# Patient Record
Sex: FEMALE | Race: WHITE | NOT HISPANIC OR LATINO
[De-identification: names, ages, dates, MRNs, and addresses within clinical notes are randomized per-mention and may not be internally consistent; named-entity substitution may affect disease eponyms.]

---

## 2018-08-27 ENCOUNTER — APPOINTMENT (OUTPATIENT)
Dept: SURGERY | Facility: CLINIC | Age: 63
End: 2018-08-27
Payer: COMMERCIAL

## 2018-08-27 VITALS
BODY MASS INDEX: 21.16 KG/M2 | SYSTOLIC BLOOD PRESSURE: 158 MMHG | WEIGHT: 115 LBS | HEIGHT: 62 IN | TEMPERATURE: 98.1 F | DIASTOLIC BLOOD PRESSURE: 82 MMHG | HEART RATE: 55 BPM

## 2018-08-27 DIAGNOSIS — Z86.018 PERSONAL HISTORY OF OTHER BENIGN NEOPLASM: ICD-10-CM

## 2018-08-27 DIAGNOSIS — Z87.828 PERSONAL HISTORY OF OTHER (HEALED) PHYSICAL INJURY AND TRAUMA: ICD-10-CM

## 2018-08-27 DIAGNOSIS — Z71.9 COUNSELING, UNSPECIFIED: ICD-10-CM

## 2018-08-27 PROBLEM — Z00.00 ENCOUNTER FOR PREVENTIVE HEALTH EXAMINATION: Status: ACTIVE | Noted: 2018-08-27

## 2018-08-27 PROCEDURE — 99243 OFF/OP CNSLTJ NEW/EST LOW 30: CPT

## 2018-10-08 ENCOUNTER — OUTPATIENT (OUTPATIENT)
Dept: OUTPATIENT SERVICES | Facility: HOSPITAL | Age: 63
LOS: 1 days | End: 2018-10-08

## 2018-10-08 VITALS
TEMPERATURE: 98 F | SYSTOLIC BLOOD PRESSURE: 130 MMHG | DIASTOLIC BLOOD PRESSURE: 82 MMHG | WEIGHT: 115.08 LBS | HEART RATE: 56 BPM | HEIGHT: 60 IN | RESPIRATION RATE: 16 BRPM

## 2018-10-08 DIAGNOSIS — S83.8X9A SPRAIN OF OTHER SPECIFIED PARTS OF UNSPECIFIED KNEE, INITIAL ENCOUNTER: Chronic | ICD-10-CM

## 2018-10-08 DIAGNOSIS — K42.9 UMBILICAL HERNIA WITHOUT OBSTRUCTION OR GANGRENE: ICD-10-CM

## 2018-10-08 DIAGNOSIS — D25.9 LEIOMYOMA OF UTERUS, UNSPECIFIED: Chronic | ICD-10-CM

## 2018-10-08 LAB
BUN SERPL-MCNC: 14 MG/DL — SIGNIFICANT CHANGE UP (ref 7–23)
CALCIUM SERPL-MCNC: 9.1 MG/DL — SIGNIFICANT CHANGE UP (ref 8.4–10.5)
CHLORIDE SERPL-SCNC: 102 MMOL/L — SIGNIFICANT CHANGE UP (ref 98–107)
CO2 SERPL-SCNC: 25 MMOL/L — SIGNIFICANT CHANGE UP (ref 22–31)
CREAT SERPL-MCNC: 0.7 MG/DL — SIGNIFICANT CHANGE UP (ref 0.5–1.3)
GLUCOSE SERPL-MCNC: 68 MG/DL — LOW (ref 70–99)
HCT VFR BLD CALC: 44.1 % — SIGNIFICANT CHANGE UP (ref 34.5–45)
HGB BLD-MCNC: 14.5 G/DL — SIGNIFICANT CHANGE UP (ref 11.5–15.5)
MCHC RBC-ENTMCNC: 29.4 PG — SIGNIFICANT CHANGE UP (ref 27–34)
MCHC RBC-ENTMCNC: 32.9 % — SIGNIFICANT CHANGE UP (ref 32–36)
MCV RBC AUTO: 89.3 FL — SIGNIFICANT CHANGE UP (ref 80–100)
NRBC # FLD: 0 — SIGNIFICANT CHANGE UP
PLATELET # BLD AUTO: 161 K/UL — SIGNIFICANT CHANGE UP (ref 150–400)
PMV BLD: 11 FL — SIGNIFICANT CHANGE UP (ref 7–13)
POTASSIUM SERPL-MCNC: 4 MMOL/L — SIGNIFICANT CHANGE UP (ref 3.5–5.3)
POTASSIUM SERPL-SCNC: 4 MMOL/L — SIGNIFICANT CHANGE UP (ref 3.5–5.3)
RBC # BLD: 4.94 M/UL — SIGNIFICANT CHANGE UP (ref 3.8–5.2)
RBC # FLD: 12.6 % — SIGNIFICANT CHANGE UP (ref 10.3–14.5)
SODIUM SERPL-SCNC: 139 MMOL/L — SIGNIFICANT CHANGE UP (ref 135–145)
WBC # BLD: 5.03 K/UL — SIGNIFICANT CHANGE UP (ref 3.8–10.5)
WBC # FLD AUTO: 5.03 K/UL — SIGNIFICANT CHANGE UP (ref 3.8–10.5)

## 2018-10-08 NOTE — H&P PST ADULT - HISTORY OF PRESENT ILLNESS
64 y/o female presents to PST for scheduled umbilical hernia repair on 10/19/18. Patient reports 6 months ago notice umbical budge, seen by PMD. Denies tenderness, or n/v.

## 2018-10-08 NOTE — H&P PST ADULT - NSANTHOSAYNRD_GEN_A_CORE
No. PIERO screening performed.  STOP BANG Legend: 0-2 = LOW Risk; 3-4 = INTERMEDIATE Risk; 5-8 = HIGH Risk

## 2018-10-08 NOTE — H&P PST ADULT - MUSCULOSKELETAL
details… detailed exam no joint swelling/no joint erythema/ROM intact/no joint warmth/normal strength/no calf tenderness

## 2018-10-08 NOTE — H&P PST ADULT - PROBLEM SELECTOR PLAN 1
Patient scheduled umbilical hernia repair on 10/19/18.  Pre op and Hibiclens instructions given and explained.  Avoid NSAIDs and OTC supplements.   Patient verbalized understanding

## 2018-10-09 ENCOUNTER — TRANSCRIPTION ENCOUNTER (OUTPATIENT)
Age: 63
End: 2018-10-09

## 2018-10-12 ENCOUNTER — TRANSCRIPTION ENCOUNTER (OUTPATIENT)
Age: 63
End: 2018-10-12

## 2018-10-18 ENCOUNTER — TRANSCRIPTION ENCOUNTER (OUTPATIENT)
Age: 63
End: 2018-10-18

## 2018-10-19 ENCOUNTER — OUTPATIENT (OUTPATIENT)
Dept: OUTPATIENT SERVICES | Facility: HOSPITAL | Age: 63
LOS: 1 days | Discharge: HOME | End: 2018-10-19
Payer: COMMERCIAL

## 2018-10-19 ENCOUNTER — RESULT REVIEW (OUTPATIENT)
Age: 63
End: 2018-10-19

## 2018-10-19 ENCOUNTER — APPOINTMENT (OUTPATIENT)
Dept: SURGERY | Facility: AMBULATORY SURGERY CENTER | Age: 63
End: 2018-10-19

## 2018-10-19 VITALS
HEART RATE: 52 BPM | HEIGHT: 60 IN | OXYGEN SATURATION: 98 % | WEIGHT: 115.08 LBS | SYSTOLIC BLOOD PRESSURE: 103 MMHG | DIASTOLIC BLOOD PRESSURE: 61 MMHG | RESPIRATION RATE: 18 BRPM | TEMPERATURE: 97 F

## 2018-10-19 VITALS
RESPIRATION RATE: 14 BRPM | OXYGEN SATURATION: 100 % | SYSTOLIC BLOOD PRESSURE: 135 MMHG | DIASTOLIC BLOOD PRESSURE: 82 MMHG | TEMPERATURE: 98 F | HEART RATE: 51 BPM

## 2018-10-19 DIAGNOSIS — D25.9 LEIOMYOMA OF UTERUS, UNSPECIFIED: Chronic | ICD-10-CM

## 2018-10-19 DIAGNOSIS — K42.9 UMBILICAL HERNIA WITHOUT OBSTRUCTION OR GANGRENE: ICD-10-CM

## 2018-10-19 DIAGNOSIS — S83.8X9A SPRAIN OF OTHER SPECIFIED PARTS OF UNSPECIFIED KNEE, INITIAL ENCOUNTER: Chronic | ICD-10-CM

## 2018-10-19 PROCEDURE — 88302 TISSUE EXAM BY PATHOLOGIST: CPT | Mod: 26

## 2018-10-19 PROCEDURE — 49587: CPT | Mod: GC

## 2018-10-19 NOTE — BRIEF OPERATIVE NOTE - PROCEDURE
<<-----Click on this checkbox to enter Procedure Umbilical hernia repair  10/19/2018    Active  ANNIE

## 2018-10-19 NOTE — ASU DISCHARGE PLAN (ADULT/PEDIATRIC). - FOLLOWUP APPOINTMENT CLINIC/PHYSICIAN
Please follow up with Dr. Kathleen in 2 weeks. Call the office to make an appointment. Please follow up with Dr. Kathleen in 2 weeks. Call the office TODAY to make an appointment.

## 2018-10-19 NOTE — ASU DISCHARGE PLAN (ADULT/PEDIATRIC). - MEDICATION SUMMARY - MEDICATIONS TO TAKE
I will START or STAY ON the medications listed below when I get home from the hospital:    PreserVision oral tablet  -- 2 tab(s) by mouth once a day  -- Indication: For Home med    Calcium 600+D 600 mg-200 intl units oral tablet  -- 2 tab(s) by mouth once a day  -- Indication: For Home med

## 2018-10-19 NOTE — ASU DISCHARGE PLAN (ADULT/PEDIATRIC). - NOTIFY
Swelling that continues/Bleeding that does not stop Persistent Nausea and Vomiting/Fever greater than 101/Swelling that continues/Bleeding that does not stop/Pain not relieved by Medications

## 2018-10-19 NOTE — ASU DISCHARGE PLAN (ADULT/PEDIATRIC). - SPECIAL INSTRUCTIONS
Please alternate motrin and tylenol for pain control for first few days.  Place covered ice/ice pack on area every 4 hours to limit swelling. Do not leave on for more than 20 minutes.

## 2018-10-24 LAB — SURGICAL PATHOLOGY STUDY: SIGNIFICANT CHANGE UP

## 2018-10-31 ENCOUNTER — APPOINTMENT (OUTPATIENT)
Dept: SURGERY | Facility: CLINIC | Age: 63
End: 2018-10-31
Payer: COMMERCIAL

## 2018-10-31 VITALS
HEART RATE: 55 BPM | SYSTOLIC BLOOD PRESSURE: 137 MMHG | BODY MASS INDEX: 21.16 KG/M2 | HEIGHT: 62 IN | OXYGEN SATURATION: 99 % | TEMPERATURE: 98.3 F | WEIGHT: 115 LBS | DIASTOLIC BLOOD PRESSURE: 79 MMHG

## 2018-10-31 PROCEDURE — 99024 POSTOP FOLLOW-UP VISIT: CPT

## 2019-03-15 ENCOUNTER — TRANSCRIPTION ENCOUNTER (OUTPATIENT)
Age: 64
End: 2019-03-15

## 2019-05-28 NOTE — ASU PREOPERATIVE ASSESSMENT, ADULT ([facility] ONLY) - TEACHING/LEARNING LEARNING PREFERENCES
Last seen: 09/27/2018  Last filled: 12/05/2018 Fenofibrate  Upcoming apt: none      
Routed to PCP.    
written material

## 2021-07-09 ENCOUNTER — APPOINTMENT (OUTPATIENT)
Dept: CARDIOLOGY | Facility: CLINIC | Age: 66
End: 2021-07-09

## 2022-09-16 NOTE — H&P PST ADULT - PRIMARY CARE PROVIDER
Dr. Gonsales [phone]
  - pLTCS  for arrest of descent  - chemical pregnancy x3    +cervical polyp  +endometrial polyp, S/P polypectomy  hydrosalpinx    IUI

## 2022-11-04 ENCOUNTER — OUTPATIENT (OUTPATIENT)
Dept: OUTPATIENT SERVICES | Facility: HOSPITAL | Age: 67
LOS: 1 days | End: 2022-11-04
Payer: MEDICARE

## 2022-11-04 DIAGNOSIS — S83.8X9A SPRAIN OF OTHER SPECIFIED PARTS OF UNSPECIFIED KNEE, INITIAL ENCOUNTER: Chronic | ICD-10-CM

## 2022-11-04 DIAGNOSIS — D25.9 LEIOMYOMA OF UTERUS, UNSPECIFIED: Chronic | ICD-10-CM

## 2022-11-04 DIAGNOSIS — Z20.828 CONTACT WITH AND (SUSPECTED) EXPOSURE TO OTHER VIRAL COMMUNICABLE DISEASES: ICD-10-CM

## 2022-11-04 LAB — SARS-COV-2 RNA SPEC QL NAA+PROBE: SIGNIFICANT CHANGE UP

## 2022-11-04 PROCEDURE — U0003: CPT

## 2022-11-04 PROCEDURE — U0005: CPT

## 2022-11-06 ENCOUNTER — TRANSCRIPTION ENCOUNTER (OUTPATIENT)
Age: 67
End: 2022-11-06

## 2022-11-07 ENCOUNTER — TRANSCRIPTION ENCOUNTER (OUTPATIENT)
Age: 67
End: 2022-11-07

## 2022-11-07 ENCOUNTER — OUTPATIENT (OUTPATIENT)
Dept: OUTPATIENT SERVICES | Facility: HOSPITAL | Age: 67
LOS: 1 days | End: 2022-11-07
Payer: MEDICARE

## 2022-11-07 VITALS
OXYGEN SATURATION: 96 % | SYSTOLIC BLOOD PRESSURE: 129 MMHG | HEART RATE: 50 BPM | HEIGHT: 61 IN | DIASTOLIC BLOOD PRESSURE: 87 MMHG | TEMPERATURE: 98 F | RESPIRATION RATE: 14 BRPM | WEIGHT: 110.67 LBS

## 2022-11-07 VITALS
DIASTOLIC BLOOD PRESSURE: 81 MMHG | RESPIRATION RATE: 15 BRPM | HEART RATE: 45 BPM | SYSTOLIC BLOOD PRESSURE: 136 MMHG | OXYGEN SATURATION: 100 %

## 2022-11-07 DIAGNOSIS — D25.9 LEIOMYOMA OF UTERUS, UNSPECIFIED: Chronic | ICD-10-CM

## 2022-11-07 DIAGNOSIS — H35.372 PUCKERING OF MACULA, LEFT EYE: ICD-10-CM

## 2022-11-07 DIAGNOSIS — S83.8X9A SPRAIN OF OTHER SPECIFIED PARTS OF UNSPECIFIED KNEE, INITIAL ENCOUNTER: Chronic | ICD-10-CM

## 2022-11-07 PROCEDURE — 67042 VIT FOR MACULAR HOLE: CPT | Mod: LT

## 2022-11-07 RX ORDER — MULTIVIT-MIN/FERROUS GLUCONATE 9 MG/15 ML
2 LIQUID (ML) ORAL
Qty: 0 | Refills: 0 | DISCHARGE

## 2022-11-07 RX ORDER — LOSARTAN POTASSIUM 100 MG/1
1 TABLET, FILM COATED ORAL
Qty: 0 | Refills: 0 | DISCHARGE

## 2022-11-07 NOTE — ASU PATIENT PROFILE, ADULT - FALL HARM RISK - UNIVERSAL INTERVENTIONS
Bed in lowest position, wheels locked, appropriate side rails in place/Call bell, personal items and telephone in reach/Instruct patient to call for assistance before getting out of bed or chair/Non-slip footwear when patient is out of bed/[place] to call system/Physically safe environment - no spills, clutter or unnecessary equipment/Purposeful Proactive Rounding/Room/bathroom lighting operational, light cord in reach

## 2022-11-07 NOTE — ASU DISCHARGE PLAN (ADULT/PEDIATRIC) - PROVIDER TOKENS
FREE:[LAST:[[place]],FIRST:[Robbie],PHONE:[phone]],FAX:[(   [phone]    -],ADDRESS:[[address] [place] [address]],SCHEDULEDAPPT:[11/08/2022],SCHEDULEDAPPTTIME:[10:30 AM],ESTABLISHEDPATIENT:[T]]

## 2022-11-07 NOTE — ASU DISCHARGE PLAN (ADULT/PEDIATRIC) - CARE PROVIDER_API CALL
Robbie Wallace  [address] [place] [address], [address]  [place] [address]  Phone: [phone]  Fax: (   )    -  Established Patient  Scheduled Appointment: 11/08/2022 10:30 AM

## 2022-11-07 NOTE — ASU DISCHARGE PLAN (ADULT/PEDIATRIC) - NS MD DC FALL RISK RISK
For information on Fall & Injury Prevention, visit: [email or web address] OR  [email or web address] OR  https://www.cdc.gov/steadi/patient.html

## 2023-01-01 NOTE — ASU PREOP CHECKLIST - WARM FLUIDS/WARM BLANKETS
Gained 1/2 oz in last 6 days. Add 1 oz of MBM pumped after nursing every 3h. See back in 2-3 days. If no wt gain, consider elim dairy or supplement Alimentum/Neutramagen. Would check stool for blood if still poor wt gain.    no

## 2024-06-14 NOTE — ASU PREOP CHECKLIST - BP NONINVASIVE DIASTOLIC (MM HG)
Dr. George office has been trying to contact her for an appointment and also for [facility] hospital follow-up with no success.    Please advise thank you      
Patient not answering  
Patient was given these medications at the hospital (Dexamethasone 4MG, Metronidazole 500MG, and Ciprofloxacin 500MG). Patient is feeling worse with her stomach issues. Patient is experiencing nauseous and lose bowels. Patient is also experiencing night esquivel and is not able to sleep. Patient is wondering if anything else can get called in to substitute these medication. Patient was seen for blood in her stool. Patient was seen on 05/31/2024 at the hospital for this issue.   
Please confirm how many days of the treatment did she complete? She can stop the steroids as this may be causing the nightmeres. GI tried to reach out to schedule an appt. She may need further evaluation to see how she is doing.    
87

## 2024-08-15 NOTE — H&P PST ADULT - PRO PAIN LIFE ADAPT
Patient missed an occupational therapy visit from [facility] on August 12, 2024.     Reason: She wants to save her visits until after the next Dr caal.       For your records only.   Per CMS Guidance, MD must be notified of missed/cancelled visits; therefore the prescribed frequency was not met. 
none

## 2024-09-26 NOTE — H&P PST ADULT - LAB RESULTS AND INTERPRETATION
Dr. Payton     Please see Guzu message below and advise, thank you       LOV with ENT 12/21/23      From: Linda Foster  To: Gale Payton  Sent: 9/24/2024  7:34 PM CDT  Subject: Referral    I've been having throat clearing issues for years now, and nothing has helped. Dr. Fernández, who I saw last, gave me a \"cocktail\" of meds and nothing helped. 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  Is there any other doc or place you can refer me to? Thank you so much-Linda Foster   
Dr. Payton, pantoprazole pended for review  
Pantoprazole 40  mg was sent to Pharmacy .  
Patient need  visit  with me or can folow up with Dr Paula -referral is in system pt has allergies   Start With Levocitrazine  5 mg  at bedtime also flonase   2 puffs every day     For possible silent  gerd - Can also start Pantoprazole 40 mg qam   60 minutes before  breakfast     Scripts sent to Pharmacy - if she like to try   
cbc, bmp done results pending

## (undated) DEVICE — DRAPE STERI-DRAPE INCISE 13X13"

## (undated) DEVICE — SOL IRR BAL SALT + 500ML

## (undated) DEVICE — DRSG MASTISOL

## (undated) DEVICE — BLANKET WARMER LOWER ADULT

## (undated) DEVICE — SUT VICRYL 8-0 12" TG140-8 DA

## (undated) DEVICE — SUT VICRYL 6-0 18" S-29 DA

## (undated) DEVICE — FORCEP END GRASPING 25GA DISP

## (undated) DEVICE — CANNULA MEDONE FLEXTIP 23G

## (undated) DEVICE — NDL HYPO SAFE 22G X 1.5"

## (undated) DEVICE — CANNULA MEDONE FLEXTIP 25G

## (undated) DEVICE — CONSTELLATION TOTAL PLUS PAK 23G

## (undated) DEVICE — WRAP COMPRESSION CALF MED

## (undated) DEVICE — LENS DISP FLAT VIT

## (undated) DEVICE — ALCON FORCEPS SHARKSKIN ILM 25G

## (undated) DEVICE — LIGHT SHIELD CORNEAL

## (undated) DEVICE — GLV 6.5 PROTEXIS

## (undated) DEVICE — SUT PLAIN GUT 6-0 18" TG140-8

## (undated) DEVICE — Device